# Patient Record
(demographics unavailable — no encounter records)

---

## 2025-07-03 NOTE — ASSESSMENT
[FreeTextEntry1] : 07/03/2025: LT humerus x-rays, 2 views, reveals GH OA.  Underlying pathology reviewed and treatment options discussed. We discussed the findings of arthritis and the potential treatment options including CSI, visco injections, and PT. Her recent A1C from April 2025 was 8.5. CSI was not elected today.  Discussed timing of injections.  She will discontinue Motrin 800mg.  Prescribed Meloxicam 15 mg - I discussed the proper use of this medication and potential side effects. Tramadol prescribed. I discussed the proper use of this medication as well as potential side effects. Activity modification as tolerated. Questions addressed. Follow up in 4-6 weeks.    The documentation recorded by the scribe accurately reflects the service I personally performed and the decisions made by me. I, Veronica Choi, attest that this documentation has been prepared under the direction and in the presence of Provider Ino Trevino MD.   The patient was seen by Ino Trevino MD.

## 2025-07-03 NOTE — DISCUSSION/SUMMARY
[de-identified] : The patient was advised of the diagnosis. The natural history of the pathology was explained in full to the patient in layman's terms. The risks and benefits of surgical and non-surgical treatment alternatives were explained in full to the patient. All questions were answered.

## 2025-07-03 NOTE — HISTORY OF PRESENT ILLNESS
[8] : 8 [6] : 6 [Diffuse] : diffuse [de-identified] : 07/03/2025: 88 yo F presents with left shoulder pain for the past 2 weeks. No specific injury. She received an CSI injection last month for the back from her PCP which only gave relief for one night. There's pain with ROM. Pain radiates down the arm. Takes 800mg of Motrin with no relief.  A1C 8.5 (April 2025)  [] : no [FreeTextEntry5] : Patient states severe left shoulder pain since 2 weeks ago, she received cortisone injection last month ago by the primary care physician.  [FreeTextEntry7] : left  shoulder and left elbow